# Patient Record
Sex: MALE | Race: OTHER | Employment: FULL TIME | URBAN - METROPOLITAN AREA
[De-identification: names, ages, dates, MRNs, and addresses within clinical notes are randomized per-mention and may not be internally consistent; named-entity substitution may affect disease eponyms.]

---

## 2024-03-26 ENCOUNTER — HOSPITAL ENCOUNTER (OUTPATIENT)
Facility: HOSPITAL | Age: 34
Setting detail: OBSERVATION
Discharge: HOME | End: 2024-03-27
Attending: EMERGENCY MEDICINE | Admitting: INTERNAL MEDICINE
Payer: COMMERCIAL

## 2024-03-26 ENCOUNTER — APPOINTMENT (EMERGENCY)
Dept: RADIOLOGY | Facility: HOSPITAL | Age: 34
End: 2024-03-26
Payer: COMMERCIAL

## 2024-03-26 DIAGNOSIS — M54.2 ANTERIOR NECK PAIN: ICD-10-CM

## 2024-03-26 DIAGNOSIS — S12.8XXA: Primary | ICD-10-CM

## 2024-03-26 DIAGNOSIS — R13.10 DYSPHAGIA: ICD-10-CM

## 2024-03-26 LAB
ANION GAP SERPL CALCULATED.3IONS-SCNC: 9 MMOL/L (ref 4–13)
BASOPHILS # BLD AUTO: 0.03 THOUSANDS/ÂΜL (ref 0–0.1)
BASOPHILS NFR BLD AUTO: 0 % (ref 0–1)
BUN SERPL-MCNC: 14 MG/DL (ref 5–25)
CALCIUM SERPL-MCNC: 9 MG/DL (ref 8.4–10.2)
CHLORIDE SERPL-SCNC: 104 MMOL/L (ref 96–108)
CO2 SERPL-SCNC: 26 MMOL/L (ref 21–32)
CREAT SERPL-MCNC: 0.94 MG/DL (ref 0.6–1.3)
EOSINOPHIL # BLD AUTO: 0.19 THOUSAND/ÂΜL (ref 0–0.61)
EOSINOPHIL NFR BLD AUTO: 2 % (ref 0–6)
ERYTHROCYTE [DISTWIDTH] IN BLOOD BY AUTOMATED COUNT: 12.9 % (ref 11.6–15.1)
GFR SERPL CREATININE-BSD FRML MDRD: 107 ML/MIN/1.73SQ M
GLUCOSE SERPL-MCNC: 84 MG/DL (ref 65–140)
HCT VFR BLD AUTO: 47.7 % (ref 36.5–49.3)
HGB BLD-MCNC: 15.7 G/DL (ref 12–17)
IMM GRANULOCYTES # BLD AUTO: 0.02 THOUSAND/UL (ref 0–0.2)
IMM GRANULOCYTES NFR BLD AUTO: 0 % (ref 0–2)
LYMPHOCYTES # BLD AUTO: 2.27 THOUSANDS/ÂΜL (ref 0.6–4.47)
LYMPHOCYTES NFR BLD AUTO: 25 % (ref 14–44)
MCH RBC QN AUTO: 30.7 PG (ref 26.8–34.3)
MCHC RBC AUTO-ENTMCNC: 32.9 G/DL (ref 31.4–37.4)
MCV RBC AUTO: 93 FL (ref 82–98)
MONOCYTES # BLD AUTO: 0.75 THOUSAND/ÂΜL (ref 0.17–1.22)
MONOCYTES NFR BLD AUTO: 8 % (ref 4–12)
NEUTROPHILS # BLD AUTO: 5.9 THOUSANDS/ÂΜL (ref 1.85–7.62)
NEUTS SEG NFR BLD AUTO: 65 % (ref 43–75)
NRBC BLD AUTO-RTO: 0 /100 WBCS
PLATELET # BLD AUTO: 210 THOUSANDS/UL (ref 149–390)
PMV BLD AUTO: 10.7 FL (ref 8.9–12.7)
POTASSIUM SERPL-SCNC: 3.7 MMOL/L (ref 3.5–5.3)
PROCALCITONIN SERPL-MCNC: <0.05 NG/ML
RBC # BLD AUTO: 5.12 MILLION/UL (ref 3.88–5.62)
SODIUM SERPL-SCNC: 139 MMOL/L (ref 135–147)
TSH SERPL DL<=0.05 MIU/L-ACNC: 0.27 UIU/ML (ref 0.45–4.5)
WBC # BLD AUTO: 9.16 THOUSAND/UL (ref 4.31–10.16)

## 2024-03-26 PROCEDURE — 31575 DIAGNOSTIC LARYNGOSCOPY: CPT | Performed by: OTOLARYNGOLOGY

## 2024-03-26 PROCEDURE — 85025 COMPLETE CBC W/AUTO DIFF WBC: CPT

## 2024-03-26 PROCEDURE — 96374 THER/PROPH/DIAG INJ IV PUSH: CPT

## 2024-03-26 PROCEDURE — 92610 EVALUATE SWALLOWING FUNCTION: CPT

## 2024-03-26 PROCEDURE — 84443 ASSAY THYROID STIM HORMONE: CPT

## 2024-03-26 PROCEDURE — 84145 PROCALCITONIN (PCT): CPT

## 2024-03-26 PROCEDURE — 99285 EMERGENCY DEPT VISIT HI MDM: CPT | Performed by: EMERGENCY MEDICINE

## 2024-03-26 PROCEDURE — 80048 BASIC METABOLIC PNL TOTAL CA: CPT

## 2024-03-26 PROCEDURE — 99284 EMERGENCY DEPT VISIT MOD MDM: CPT

## 2024-03-26 PROCEDURE — 36415 COLL VENOUS BLD VENIPUNCTURE: CPT

## 2024-03-26 PROCEDURE — 99221 1ST HOSP IP/OBS SF/LOW 40: CPT | Performed by: OTOLARYNGOLOGY

## 2024-03-26 PROCEDURE — 70491 CT SOFT TISSUE NECK W/DYE: CPT

## 2024-03-26 RX ORDER — PANTOPRAZOLE SODIUM 40 MG/1
40 TABLET, DELAYED RELEASE ORAL
Status: DISCONTINUED | OUTPATIENT
Start: 2024-03-26 | End: 2024-03-27 | Stop reason: HOSPADM

## 2024-03-26 RX ORDER — OXYCODONE HCL 5 MG/5 ML
5 SOLUTION, ORAL ORAL ONCE
Status: COMPLETED | OUTPATIENT
Start: 2024-03-26 | End: 2024-03-26

## 2024-03-26 RX ORDER — SODIUM CHLORIDE 9 MG/ML
100 INJECTION, SOLUTION INTRAVENOUS CONTINUOUS
Status: DISCONTINUED | OUTPATIENT
Start: 2024-03-27 | End: 2024-03-27 | Stop reason: HOSPADM

## 2024-03-26 RX ORDER — FAMOTIDINE 20 MG/1
40 TABLET, FILM COATED ORAL EVERY MORNING
Status: DISCONTINUED | OUTPATIENT
Start: 2024-03-26 | End: 2024-03-27 | Stop reason: HOSPADM

## 2024-03-26 RX ORDER — OXYCODONE HCL 5 MG/5 ML
10 SOLUTION, ORAL ORAL ONCE
Status: COMPLETED | OUTPATIENT
Start: 2024-03-26 | End: 2024-03-26

## 2024-03-26 RX ORDER — DEXAMETHASONE SODIUM PHOSPHATE 10 MG/ML
10 INJECTION, SOLUTION INTRAMUSCULAR; INTRAVENOUS EVERY 6 HOURS SCHEDULED
Status: DISCONTINUED | OUTPATIENT
Start: 2024-03-26 | End: 2024-03-27 | Stop reason: HOSPADM

## 2024-03-26 RX ORDER — MORPHINE SULFATE 4 MG/ML
4 INJECTION, SOLUTION INTRAMUSCULAR; INTRAVENOUS ONCE
Status: COMPLETED | OUTPATIENT
Start: 2024-03-26 | End: 2024-03-26

## 2024-03-26 RX ADMIN — DEXAMETHASONE SODIUM PHOSPHATE 10 MG: 10 INJECTION, SOLUTION INTRAMUSCULAR; INTRAVENOUS at 23:45

## 2024-03-26 RX ADMIN — MORPHINE SULFATE 4 MG: 4 INJECTION INTRAVENOUS at 05:57

## 2024-03-26 RX ADMIN — DEXAMETHASONE SODIUM PHOSPHATE 15 MG: 10 INJECTION, SOLUTION INTRAMUSCULAR; INTRAVENOUS at 02:38

## 2024-03-26 RX ADMIN — SODIUM CHLORIDE 100 ML/HR: 0.9 INJECTION, SOLUTION INTRAVENOUS at 23:45

## 2024-03-26 RX ADMIN — OXYCODONE HYDROCHLORIDE 10 MG: 5 SOLUTION ORAL at 03:48

## 2024-03-26 RX ADMIN — DEXAMETHASONE SODIUM PHOSPHATE 10 MG: 10 INJECTION, SOLUTION INTRAMUSCULAR; INTRAVENOUS at 11:30

## 2024-03-26 RX ADMIN — OXYCODONE HYDROCHLORIDE 5 MG: 5 SOLUTION ORAL at 03:00

## 2024-03-26 RX ADMIN — IOHEXOL 85 ML: 350 INJECTION, SOLUTION INTRAVENOUS at 04:11

## 2024-03-26 RX ADMIN — DEXAMETHASONE SODIUM PHOSPHATE 10 MG: 10 INJECTION, SOLUTION INTRAMUSCULAR; INTRAVENOUS at 17:19

## 2024-03-26 NOTE — ED PROVIDER NOTES
History  Chief Complaint   Patient presents with    Neck Pain     Presents to ER with pain in the front of the neck, hurts upon palpation. Unable to eat/ drink, difficulty talking and swallowing. Voice hoarse sounding      33-year-old male with no pertinent past medical history presents with neck pain and dysphagia for 3 days.  Symptoms are gradual in onset and worsening.  Patient also reports dysphonia.  Able to tolerate his secretions.  No history of similar symptoms.        None       No past medical history on file.    No past surgical history on file.    No family history on file.  I have reviewed and agree with the history as documented.    E-Cigarette/Vaping     E-Cigarette/Vaping Substances     Social History     Tobacco Use    Smoking status: Never    Smokeless tobacco: Never   Substance Use Topics    Alcohol use: Never    Drug use: Never        Review of Systems   Constitutional:  Negative for chills and fever.   HENT:  Positive for sore throat. Negative for ear pain.    Eyes:  Negative for pain and visual disturbance.   Respiratory:  Negative for cough and shortness of breath.    Cardiovascular:  Negative for chest pain and palpitations.   Gastrointestinal:  Negative for abdominal pain and vomiting.   Genitourinary:  Negative for dysuria and hematuria.   Musculoskeletal:  Negative for arthralgias and back pain.   Skin:  Negative for color change and rash.   Neurological:  Negative for seizures and syncope.   All other systems reviewed and are negative.      Physical Exam  ED Triage Vitals   Temperature Pulse Respirations Blood Pressure SpO2   03/26/24 0130 03/26/24 0130 03/26/24 0130 03/26/24 0130 03/26/24 0130   98.4 °F (36.9 °C) 89 18 154/99 97 %      Temp Source Heart Rate Source Patient Position - Orthostatic VS BP Location FiO2 (%)   03/26/24 0130 03/26/24 0130 03/26/24 0130 03/26/24 0130 --   Tympanic Monitor Sitting Left arm       Pain Score       03/26/24 0300       10 - Worst Possible Pain              Orthostatic Vital Signs  Vitals:    03/26/24 0330 03/26/24 0600 03/26/24 1033 03/26/24 1410   BP: 138/86 141/86  143/92   Pulse: 75 90 83 89   Patient Position - Orthostatic VS: Lying          Physical Exam  Vitals and nursing note reviewed.   Constitutional:       General: He is in acute distress.      Appearance: Normal appearance. He is well-developed and normal weight. He is not toxic-appearing or diaphoretic.   HENT:      Head: Normocephalic and atraumatic.      Right Ear: External ear normal.      Left Ear: External ear normal.      Nose: Nose normal.      Mouth/Throat:      Mouth: Mucous membranes are moist.      Pharynx: Posterior oropharyngeal erythema present. No oropharyngeal exudate.      Comments: Erythema in the posterior pharynx.  No oropharyngeal edema.  Eyes:      Conjunctiva/sclera: Conjunctivae normal.   Neck:      Comments: Tender indurated mass right anterior neck between the thyroid and SCM  Cardiovascular:      Rate and Rhythm: Normal rate.   Pulmonary:      Effort: Pulmonary effort is normal. No respiratory distress.   Musculoskeletal:         General: No swelling.      Cervical back: Normal range of motion and neck supple. Tenderness present. No rigidity.   Skin:     General: Skin is warm and dry.      Capillary Refill: Capillary refill takes less than 2 seconds.   Neurological:      Mental Status: He is alert and oriented to person, place, and time.   Psychiatric:         Mood and Affect: Mood normal.         Behavior: Behavior normal.         ED Medications  Medications   dexamethasone (PF) (DECADRON) injection 10 mg (10 mg Intravenous Given 3/26/24 1719)   pantoprazole (PROTONIX) EC tablet 40 mg (40 mg Oral Not Given 3/26/24 1000)   famotidine (PEPCID) tablet 40 mg (40 mg Oral Not Given 3/26/24 1052)   sodium chloride 0.9 % infusion (has no administration in time range)   dexamethasone oral liquid 15 mg 1.5 mL (15 mg Oral Given 3/26/24 0238)   oxyCODONE (ROXICODONE) oral solution  5 mg (5 mg Oral Given 3/26/24 0300)   oxyCODONE (ROXICODONE) oral solution 10 mg (10 mg Oral Given 3/26/24 0348)   iohexol (OMNIPAQUE) 350 MG/ML injection (MULTI-DOSE) 85 mL (85 mL Intravenous Given 3/26/24 8711)   morphine injection 4 mg (4 mg Intravenous Given 3/26/24 0557)       Diagnostic Studies  Results Reviewed       Procedure Component Value Units Date/Time    TSH, 3rd generation [[id number]]  (Abnormal) Collected: 03/26/24 1406    Lab Status: Final result Specimen: Blood from Arm, Left Updated: 03/26/24 1447     TSH 3RD GENERATON 0.265 uIU/mL     Procalcitonin [[id number]]  (Normal) Collected: 03/26/24 0600    Lab Status: Final result Specimen: Blood from Arm, Left Updated: 03/26/24 0636     Procalcitonin <0.05 ng/ml     Basic metabolic panel [[id number]] Collected: 03/26/24 0600    Lab Status: Final result Specimen: Blood from Arm, Left Updated: 03/26/24 0628     Sodium 139 mmol/L      Potassium 3.7 mmol/L      Chloride 104 mmol/L      CO2 26 mmol/L      ANION GAP 9 mmol/L      BUN 14 mg/dL      Creatinine 0.94 mg/dL      Glucose 84 mg/dL      Calcium 9.0 mg/dL      eGFR 107 ml/min/1.73sq m     Narrative:      National Kid[employer] Disease Foundation guidelines for Chronic Kidney Disease (CKD):     Stage 1 with normal or high GFR (GFR > 90 mL/min/1.73 square meters)    Stage 2 Mild CKD (GFR = 60-89 mL/min/1.73 square meters)    Stage 3A Moderate CKD (GFR = 45-59 mL/min/1.73 square meters)    Stage 3B Moderate CKD (GFR = 30-44 mL/min/1.73 square meters)    Stage 4 Severe CKD (GFR = 15-29 mL/min/1.73 square meters)    Stage 5 End Stage CKD (GFR <15 mL/min/1.73 square meters)  Note: GFR calculation is accurate only with a steady state creatinine    CBC and differential [[id number]] Collected: 03/26/24 0600    Lab Status: Final result Specimen: Blood from Arm, Left Updated: 03/26/24 0606     WBC 9.16 Thousand/uL      RBC 5.12 Million/uL      Hemoglobin 15.7 g/dL      Hematocrit 47.7 %      MCV 93 fL      MCH 30.7 pg       MCHC 32.9 g/dL      RDW 12.9 %      MPV 10.7 fL      Platelets 210 Thousands/uL      nRBC 0 /100 WBCs      Neutrophils Relative 65 %      Immature Grans % 0 %      Lymphocytes Relative 25 %      Monocytes Relative 8 %      Eosinophils Relative 2 %      Basophils Relative 0 %      Neutrophils Absolute 5.90 Thousands/µL      Absolute Immature Grans 0.02 Thousand/uL      Absolute Lymphocytes 2.27 Thousands/µL      Absolute Monocytes 0.75 Thousand/µL      Eosinophils Absolute 0.19 Thousand/µL      Basophils Absolute 0.03 Thousands/µL                    CT soft tissue neck with contrast   Final Result by Srinivasa Rodriguez MD (03/26 0531)      Abnormal findings of the thyroid cartilage suspicious for acute to subacute right anterior paramedian nondisplaced fracture and old healed left anterior fracture.      Trace posttraumatic or inflammatory fluid immediately overlying the anterior thyroid cartilage extending inferiorly and posterolaterally on the right side with minimal thickening of the right vocal cord. Cervical airway is patent.      No other evidence of acute process in the neck.      I personally discussed this study with BAILEY BOYKIN on 3/26/2024 at 05:26            Workstation performed: [id number]               Procedures  Procedures      ED Course                             SBIRT 20yo+      Flowsheet Row Most Recent Value   Initial Alcohol Screen: US AUDIT-C     1. How often do you have a drink containing alcohol? 0 Filed at: 03/26/2024 0133   2. How many drinks containing alcohol do you have on a typical day you are drinking?  0 Filed at: 03/26/2024 0133   3a. Male UNDER 65: How often do you have five or more drinks on one occasion? 0 Filed at: 03/26/2024 0133   Audit-C Score 0 Filed at: 03/26/2024 0133   SRUTHI: How many times in the past year have you...    Used an illegal drug or used a prescription medication for non-medical reasons? Never Filed at: 03/26/2024 0133                  Medical  Decision Making  33-year-old male with presentation concerning for deep neck space infection.  Will order CT soft tissue neck to assess for peritonsillar abscess, retropharyngeal abscess, other deep neck space infections.    Workup significant for acute/subacute laryngeal injury.  ENT consulted.    Amount and/or Complexity of Data Reviewed  Labs: ordered.  Radiology: ordered.    Risk  Prescription drug management.  Decision regarding hospitalization.          Disposition  Final diagnoses:   Anterior neck pain   Dysphagia   Fracture of laryngeal cartilage, initial encounter (HCC)     Time reflects when diagnosis was documented in both MDM as applicable and the Disposition within this note       Time User Action Codes Description Comment    3/26/2024  6:47 AM Rich Lainez [M54.2] Anterior neck pain     3/26/2024  6:47 AM Rich Lainez [R13.10] Dysphagia     3/26/2024  7:21 AM Rich Lainez [S12.8XXA] Fracture of laryngeal cartilage, initial encounter (HCC)     3/26/2024  9:43 AM Shahab Porter [M54.2] Anterior neck pain     3/26/2024  9:43 AM Shahab Porter [S12.8XXA] Fracture of laryngeal cartilage, initial encounter (HCC)           ED Disposition       ED Disposition   Admit    Condition   Stable    Date/Time   Tue Mar 26, 2024 1020    Comment   Case was discussed with sod and the patient's admission status was agreed to be Admission Status: observation status to the service of Dr. Larkin .               Follow-up Information       Follow up With Specialties Details Why Contact Info Additional Information    [facility]   [facility]  [address]  [facility] [id number] [phone] [facility], [facility] [address] [address]            There are no discharge medications for this patient.    No discharge procedures on file.    PDMP Review       None             ED Provider  Attending physically  available and evaluated Smith Henning. I managed the patient along with the ED Attending.    Electronically Signed by           Rich Lainez MD  03/26/24 2055

## 2024-03-26 NOTE — ED ATTENDING ATTESTATION
3/26/2024  IHector MD, saw and evaluated the patient. I have discussed the patient with the resident/non-physician practitioner and agree with the resident's/non-physician practitioner's findings, Plan of Care, and MDM as documented in the resident's/non-physician practitioner's note, except where noted. All available labs and Radiology studies were reviewed.  I was present for key portions of any procedure(s) performed by the resident/non-physician practitioner and I was immediately available to provide assistance.       At this point I agree with the current assessment done in the Emergency Department.  I have conducted an independent evaluation of this patient a history and physical is as follows:    ED Course  ED Course as of 03/26/24 0510   Tue Mar 26, 2024   0222 Per resident h&p 32 YO M presents for sore throat neck mass I/P CT ST neck; corticosteroids     Emergency Department Note- Smith Henning 33 y.o. male MRN: [id number]    Unit/Bed#: ED 24 Encounter: [id number]    Smith Henning is a 33 y.o. male who presents with   Chief Complaint   Patient presents with    Neck Pain     Presents to ER with pain in the front of the neck, hurts upon palpation. Unable to eat/ drink, difficulty talking and swallowing. Voice hoarse sounding          History of Present Illness   HPI:  Smith Henning is a 33 y.o. male who presents for evaluation of:  Sore throat and right sided anterior neck discomfort.  Patient denies any trauma to the area.  Patient's voice sounds hoarse to him.  He denies associated fevers and chills.  He denies difficulty swallowing.  He denies associated dyspnea and cough.  He denies any posterior neck discomfort.  The discomfort is aching and of moderate intensity.  Movement provokes the discomfort; rest somewhat palliates the discomfort.    Review of Systems   Constitutional:  Negative for fatigue and fever.   HENT:  Positive for sore throat and voice change. Negative for  congestion and trouble swallowing.    Respiratory:  Negative for cough and shortness of breath.    Cardiovascular:  Negative for chest pain and palpitations.   Gastrointestinal:  Negative for abdominal pain and nausea.   Genitourinary:  Negative for flank pain and frequency.   Neurological:  Negative for light-headedness and headaches.   Psychiatric/Behavioral:  Negative for dysphoric mood and hallucinations.    All other systems reviewed and are negative.      Historical Information   No past medical history on file.  No past surgical history on file.  Social History   Social History     Substance and Sexual Activity   Alcohol Use Never     Social History     Substance and Sexual Activity   Drug Use Never     Social History     Tobacco Use   Smoking Status Never   Smokeless Tobacco Never     Family History: No family history on file.    Meds/Allergies   PTA meds:   None     No Known Allergies    Objective   First Vitals:   Blood Pressure: 154/99 (03/26/24 0130)  Pulse: 89 (03/26/24 0130)  Temperature: 98.4 °F (36.9 °C) (03/26/24 0130)  Temp Source: Tympanic (03/26/24 0130)  Respirations: 18 (03/26/24 0130)  SpO2: 97 % (03/26/24 0130)    Current Vitals:   Blood Pressure: 138/86 (03/26/24 0330)  Pulse: 75 (03/26/24 0330)  Temperature: 98.4 °F (36.9 °C) (03/26/24 0130)  Temp Source: Tympanic (03/26/24 0130)  Respirations: 18 (03/26/24 0330)  SpO2: 99 % (03/26/24 0330)    No intake or output data in the 24 hours ending 03/26/24 0510    Invasive Devices       Peripheral Intravenous Line  Duration             Peripheral IV 03/26/24 Left;Ventral (anterior) Forearm <1 day                    Physical Exam  Vitals and nursing note reviewed.   Constitutional:       General: He is not in acute distress.     Appearance: Normal appearance. He is well-developed.   HENT:      Head: Normocephalic and atraumatic.      Jaw: No trismus, tenderness or swelling.      Right Ear: External ear normal.      Left Ear: External ear normal.     "  Nose: Nose normal.      Mouth/Throat:      Dentition: Normal dentition. No dental tenderness or dental caries.      Palate: No mass.      Pharynx: No pharyngeal swelling, oropharyngeal exudate, posterior oropharyngeal erythema or uvula swelling.      Tonsils: No tonsillar exudate or tonsillar abscesses.   Eyes:      Conjunctiva/sclera: Conjunctivae normal.      Pupils: Pupils are equal, round, and reactive to light.   Cardiovascular:      Rate and Rhythm: Normal rate and regular rhythm.   Pulmonary:      Effort: Pulmonary effort is normal. No respiratory distress.   Abdominal:      General: Abdomen is flat. There is no distension.      Palpations: Abdomen is soft.   Musculoskeletal:         General: No deformity. Normal range of motion.      Cervical back: Normal range of motion and neck supple.   Skin:     General: Skin is warm and dry.      Capillary Refill: Capillary refill takes less than 2 seconds.   Neurological:      General: No focal deficit present.      Mental Status: He is alert and oriented to person, place, and time. Mental status is at baseline.      Coordination: Coordination normal.   Psychiatric:         Mood and Affect: Mood normal.         Behavior: Behavior normal.         Thought Content: Thought content normal.         Judgment: Judgment normal.           Medical Decision Makin.  Acute throat discomfort: CT scan soft tissue neck with contrast rule out neck abscess.    No results found for this or any previous visit (from the past 36 hour(s)).  CT soft tissue neck with contrast    (Results Pending)         Portions of the record may have been created with voice recognition software. Occasional wrong word or \"sound a like\" substitutions may have occurred due to the inherent limitations of voice recognition software.  Read the chart carefully and recognize, using context, where substitutions have occurred.        Critical Care Time  Procedures      "

## 2024-03-26 NOTE — CONSULTS
Consultation - [facility]/ENT   Smith Henning 33 y.o. male MRN: [id number]  Unit/Bed#: ED 24 Encounter: [id number]        Assessment:  33 year-old man with acute to subacute fracture of his thyroid cartilage without any respiratory distress or airway obstruction on flexible fiberoptic laryngoscopy.    Plan:  Admission to medicine for observation.  Patient can have diet today. He should have some fluids for his dehydration.  Patient will need to be made NPO at midnight tonight.  Repeat bedside laryngoscopy tomorrow morning.  Decadron 10 mg Q 6.  Pepcid 40 mgs QHS.  Omeprazole 40 mgs QAM.  Voice rest for 1 - 2 weeks.  If patient's clinical status remains stable or improves, anticipated discharge from ENT perspective would be 1 - 2 days.    History of Present Illness   Physician Requesting Consult: Adalberto Bright DO  Reason for Consult / Principal Problem: laryngeal cartilage fracture  HPI: Smith Henning is a 33 y.o. year old male who presents with throat pain that started about 1 month ago after a hard coughing episode. He noticed voice changes afterwards as well. No breathing difficulties currently. He denies any trauma. A CT of the neck was acquired in the [place] ED today.    Review of systems:  10 Point ROS was performed and negative except as above or otherwise noted in the medical record.    Historical Information   No past medical history on file.  No past surgical history on file.  Social History   Social History     Substance and Sexual Activity   Alcohol Use Never     Social History     Substance and Sexual Activity   Drug Use Never     Social History     Tobacco Use   Smoking Status Never   Smokeless Tobacco Never     Family History: non-contributory    Meds/Allergies   all current active meds have been reviewed    No Known Allergies    Objective     Vitals:    03/26/24 0600   BP: 141/86   Pulse: 90   Resp: 18   Temp:    SpO2: 97%         Physical Exam   Constitutional: Oriented to person,  place, and time. Well-developed and well-nourished, no apparent distress, non-toxic appearance. Cooperative, able to hear and answer questions without difficulty.    Voice: Mild raspiness, soft.  Head: Normocephalic, atraumatic.  No scars, masses or lesions.  Face: Symmetric, no edema, no sinus tenderness.  Eyes: Vision grossly intact, extra-ocular movement intact.  Ears: External ears normal.  No post-auricular erythema or tenderness.  Nose: Mild right septal caudal deviation, dry nasal mucosa.  Oral cavity: Dentition intact.  Mucosa moist, lips without lesions or masses.  Tongue mobile, floor of mouth soft and flat.  Hard palate intact.  No masses or lesions.   Oropharynx: Uvula is midline, soft palate intact without lesion or mass.  Oropharyngeal inlet without obstruction.  Tonsils unremarkable.  Posterior pharyngeal wall clear. No masses or lesions.  Salivary glands:  Parotid glands and submandibular glands symmetric, no enlargement or tenderness.  Neck: Tenderness of right anterior neck. Mild swelling of right anterior neck from larynx to suprasternal notch.  Thyroid: Without tenderness or palpable nodules.  Pulmonary/Chest: Normal effort and rate. No respiratory distress. No stertor or stridor  Musculoskeletal: Normal range of motion.   Neurological: Cranial nerves 2-12 intact.  Skin: Skin is warm and dry.   Psychiatric: Normal mood and affect.    Procedure: Fiberoptic nasopharyngolaryngoscopy  Diagnosis: laryngeal fracture    The risks, benefits and alternatives were discussed. The patient voiced their understanding. They wished to proceed and an informed consent was obtained. The patient's nose was topically decongested and anesthetized using Lidocaine and oxymetazoline. To obtain a view not available with a laryngeal mirror, a fiberoptic endoscope was inserted via the left nasal cavity.      Findings listed below:    -Normal appearing nasal cavity, nasopharynx, fossa of Rosenmüller, oropharynx, BOT,  epiglottis.  - Both vocal folds were mobile however there was decreased abduction bilaterally.  - Mild edema of the right vocal fold and false fold.  - Patent airway  - No Subglottic edema  - Mild arytenoid erythema  - Mild diffuse edema without vocal fold edema  - No posterior commissure hypertrophy  - No granulation  - No thick endolaryngeal mucus    No intake or output data in the 24 hours ending 03/26/24 0924    Invasive Devices       Peripheral Intravenous Line  Duration             Peripheral IV 03/26/24 Left;Ventral (anterior) Forearm <1 day                    Lab Results: I have personally reviewed pertinent lab results.    Imaging Studies: I have personally reviewed pertinent reports.    CT neck reviewed.  EKG, Pathology, and Other Studies: I have personally reviewed pertinent reports.      Code Status: No Order  Advance Directive and Living Will:      Power of :    POLST:      Sergio Evans MD PGY-3  [facility] Otolaryngology - Head and Neck Surgery  Available on [employer] Text.  Please contact ENT Resident [employer] Text Role for any questions or concerns.

## 2024-03-26 NOTE — H&P
INTERNAL MEDICINE RESIDENCY ADMISSION H&P     Name: Smith Henning   Age & Sex: 33 y.o. male   MRN: [id number]  Unit/Bed#: ED 24   Encounter: [id number]  Primary Care Provider: No primary care provider on file.    Code Status: Level 1 - Full Code  Admission Status: OBSERVATION  Disposition: Patient requires Med/Surg    Admit to team: SOD Team A    ASSESSMENT/PLAN     Active Problems:    Atraumatic, closed fracture of thyroid cartilage of Unknown Etiology      No new Assessment & Plan notes have been filed under this hospital service since the last note was generated.  Service: Internal Medicine      VTE Pharmacologic Prophylaxis: Reason for no pharmacologic prophylaxis not at high risk for VTE  VTE Mechanical Prophylaxis: reason for no mechanical VTE prophylaxis not at high risk for VTE    CHIEF COMPLAINT     Chief Complaint   Patient presents with    Neck Pain     Presents to ER with pain in the front of the neck, hurts upon palpation. Unable to eat/ drink, difficulty talking and swallowing. Voice hoarse sounding       HISTORY OF PRESENT ILLNESS     Patient is a 32 yo M with self reported history of GERD presenting with 3 days of worsening anterior throat pain, odynophagia, and dysphonia. He denies any trauma to the area, but notes a coughing episode about a month ago that may be related to this injury. He denies any history of bones fracturing easily. His reflux is controlled by diet and is currently not on medication.     CT soft tissue neck with contrast showed a patent airway with an acute to subacute right anterior paramedian nondisplaced fracture of the thyroid cartilage and old healed left anterior fracture.     ENT was consulted and performed a fiberoptic nasopharyngolaryngoscopy It was notable for decreased abduction of vocal cords bilaterally, mild edema of the R vocal fold and false fold, mild arytenoid erythema, mild diffuse edema without vocal fold edema. Normal appearing nasal cavity,  nasopharynx, fossa of Rosenmüller, oropharynx, BOT, epiglottis and negative for granulation, endolaryngeal mucus, and posterior commissure hypertrophy.       REVIEW OF SYSTEMS     Review of Systems   Constitutional:  Negative for chills and fever.   HENT:  Positive for trouble swallowing and voice change. Negative for dental problem, drooling and mouth sores.    Respiratory:  Negative for shortness of breath, wheezing and stridor.    Gastrointestinal:         History of reflux     OBJECTIVE     Vitals:    24 0130 24 0330 24 0600 24 1033   BP: 154/99 138/86 141/86    BP Location: Left arm Right arm     Pulse: 89 75 90 83   Resp: 18 18 18 16   Temp: 98.4 °F (36.9 °C)      TempSrc: Tympanic      SpO2: 97% 99% 97% 97%      Temperature:   Temp (24hrs), Av.4 °F (36.9 °C), Min:98.4 °F (36.9 °C), Max:98.4 °F (36.9 °C)    Temperature: 98.4 °F (36.9 °C)  Intake & Output:  I/O       None          Weights:        There is no height or weight on file to calculate BMI.  Weight (last 2 days)       None          Physical Exam  Constitutional:       General: He is not in acute distress.     Appearance: Normal appearance.   HENT:      Head: Normocephalic.      Right Ear: External ear normal.      Left Ear: External ear normal.      Mouth/Throat:      Mouth: Mucous membranes are moist.      Pharynx: Oropharynx is clear. No oropharyngeal exudate or posterior oropharyngeal erythema.      Comments: Mildly hoarse voice  Eyes:      Extraocular Movements: Extraocular movements intact.      Conjunctiva/sclera: Conjunctivae normal.      Pupils: Pupils are equal, round, and reactive to light.   Cardiovascular:      Rate and Rhythm: Normal rate and regular rhythm.      Heart sounds: Normal heart sounds.   Pulmonary:      Effort: Pulmonary effort is normal. No respiratory distress.      Breath sounds: Normal breath sounds. No wheezing, rhonchi or rales.   Abdominal:      General: Abdomen is flat.      Palpations:  "Abdomen is soft.   Musculoskeletal:         General: Normal range of motion.      Cervical back: No rigidity.   Skin:     General: Skin is warm and dry.   Neurological:      General: No focal deficit present.      Mental Status: He is alert and oriented to person, place, and time.   Psychiatric:         Mood and Affect: Mood normal.         Behavior: Behavior normal.       PAST MEDICAL HISTORY   No past medical history on file.  PAST SURGICAL HISTORY   No past surgical history on file.  SOCIAL & FAMILY HISTORY     Social History     Substance and Sexual Activity   Alcohol Use Never       Social History     Substance and Sexual Activity   Drug Use Never     Social History     Tobacco Use   Smoking Status Never   Smokeless Tobacco Never     No family history on file.  LABORATORY DATA     Labs: I have personally reviewed pertinent reports.    Results from last 7 days   Lab Units 03/26/24  0600   WBC Thousand/uL 9.16   HEMOGLOBIN g/dL 15.7   HEMATOCRIT % 47.7   PLATELETS Thousands/uL 210   NEUTROS PCT % 65   MONOS PCT % 8   EOS PCT % 2      Results from last 7 days   Lab Units 03/26/24  0600   POTASSIUM mmol/L 3.7   CHLORIDE mmol/L 104   CO2 mmol/L 26   BUN mg/dL 14   CREATININE mg/dL 0.94   CALCIUM mg/dL 9.0                          Micro:  No results found for: \"BLOODCX\", \"URINECX\", \"WOUNDCULT\", \"SPUTUMCULTUR\"  IMAGING & DIAGNOSTIC TESTS     Imaging: I have personally reviewed pertinent reports.    CT soft tissue neck with contrast    Result Date: 3/26/2024  Impression: Abnormal findings of the thyroid cartilage suspicious for acute to subacute right anterior paramedian nondisplaced fracture and old healed left anterior fracture. Trace posttraumatic or inflammatory fluid immediately overlying the anterior thyroid cartilage extending inferiorly and posterolaterally on the right side with minimal thickening of the right vocal cord. Cervical airway is patent. No other evidence of acute process in the neck. I personally " discussed this study with BAILEY BOYKIN on 3/26/2024 at 05:26 Workstation performed: [id number]     EKG, Pathology, and Other Studies: I have personally reviewed pertinent reports.     ALLERGIES   No Known Allergies  MEDICATIONS PRIOR TO ARRIVAL     Prior to Admission medications    Not on File     MEDICATIONS ADMINISTERED IN LAST 24 HOURS     Medication Administration - last 24 hours from 03/25/2024 1125 to 03/26/2024 1125         Date/Time Order Dose Route Action Action by     03/26/2024 0238 EDT dexamethasone oral liquid 15 mg 1.5 mL 15 mg Oral Given Lisbeth Morris RN     03/26/2024 0300 EDT oxyCODONE (ROXICODONE) oral solution 5 mg 5 mg Oral Given Lisbeth Morris RN     03/26/2024 0348 EDT oxyCODONE (ROXICODONE) oral solution 10 mg 10 mg Oral Given Lisbeth Morris RN     03/26/2024 0411 EDT iohexol (OMNIPAQUE) 350 MG/ML injection (MULTI-DOSE) 85 mL 85 mL Intravenous Given Nicolasa Ferraira     03/26/2024 0557 EDT morphine injection 4 mg 4 mg Intravenous Given Lisbeth Morris RN     03/26/2024 1000 EDT pantoprazole (PROTONIX) EC tablet 40 mg 40 mg Oral Not Given Leatha Pimentel RN     03/26/2024 1052 EDT famotidine (PEPCID) tablet 40 mg 40 mg Oral Not Given Leatha Pimentel RN          CURRENT MEDICATIONS     Current Facility-Administered Medications   Medication Dose Route Frequency Provider Last Rate    dexamethasone  10 mg Intravenous Q6H [facility] Rizwan Michael MD      famotidine  40 mg Oral QAM Rizwan Michael MD      pantoprazole  40 mg Oral Early Morning Rizwan Michael MD      [START ON 3/27/2024] sodium chloride  100 mL/hr Intravenous Continuous Rizwan Michael MD       [START ON 3/27/2024] sodium chloride, 100 mL/hr           Admission Time  I spent 30 minutes admitting the patient.  This involved direct patient contact where I performed a full history and physical, reviewing previous records, and reviewing laboratory and other diagnostic studies.    Portions  "of the record may have been created with voice recognition software.  Occasional wrong word or \"sound a like\" substitutions may have occurred due to the inherent limitations of voice recognition software.  Read the chart carefully and recognize, using context, where substitutions have occurred.    ==    Ras Michael MD  [facility]  Internal Medicine Residency PGY-2   "

## 2024-03-26 NOTE — Clinical Note
Smith Henning was seen and treated in our emergency department on 3/26/2024.                Diagnosis:     Smith  is off the rest of the shift today.    He may return on this date: 03/27/2024         If you have any questions or concerns, please don't hesitate to call.      Rich Lainez MD    ______________________________           _______________          _______________  Hospital Representative                              Date                                Time

## 2024-03-26 NOTE — ED CARE HANDOFF
Emergency Department Sign Out Note        Sign out and transfer of care from Dr. Rich Lainez. See Separate Emergency Department note.     The patient, Smith Henning, was evaluated by the previous provider for neck pain, dysphagia.    Workup Completed:  Cbc bmp ct neck     ED Course / Workup Pending (followup):  ENT consultation                                  ED Course as of 03/26/24 1509   Tue Mar 26, 2024   0701 SO: laryngeal fracture, ENT consult    0715 ENT coming fo reval, likely DC home   0948 Will be admitted to SOD service.  Pending [place] text from SOD resident.  Spoke with ENT attending.  Wants admission, steroid course, repeat laryngoscopy tomorrow morning     Procedures  Medical Decision Making  This was discussed with ENT attending.  Patient will be admitted to medicine service for further medical management.  Treatment is focused on reducing inflammation.  Plan for repeat laryngoscope be tomorrow.    Amount and/or Complexity of Data Reviewed  Labs: ordered.  Radiology: ordered.    Risk  Prescription drug management.  Decision regarding hospitalization.            Disposition  Final diagnoses:   Anterior neck pain   Dysphagia   Fracture of laryngeal cartilage, initial encounter (HCC)     Time reflects when diagnosis was documented in both MDM as applicable and the Disposition within this note       Time User Action Codes Description Comment    3/26/2024  6:47 AM Rich Lainez [M54.2] Anterior neck pain     3/26/2024  6:47 AM Rich Lainez [R13.10] Dysphagia     3/26/2024  7:21 AM Rich Lainez [S12.8XXA] Fracture of laryngeal cartilage, initial encounter (HCC)     3/26/2024  9:43 AM Shahab Porter Modify [M54.2] Anterior neck pain     3/26/2024  9:43 AM Shahab Porter Modify [S12.8XXA] Fracture of laryngeal cartilage, initial encounter (HCC)           ED Disposition       ED Disposition   Admit    Condition   Stable    Date/Time   Tue Mar 26, 2024 10:20 AM    Comment   Case was discussed  with sod and the patient's admission status was agreed to be Admission Status: observation status to the service of Dr. Larkin .               Follow-up Information       Follow up With Specialties Details Why Contact Info Additional Information    [facility]   [facility]  [address]  [facility] [id number] [phone] [facility], [facility] [address] [facility], [place] [address]          There are no discharge medications for this patient.    No discharge procedures on file.       ED Provider  Electronically Signed by     Shahab Porter DO  03/26/24 [id number]

## 2024-03-26 NOTE — LETTER
[facility]  [address] [address]  Dept: [phone]    March 27, 2024     Patient: Smith Henning   YOB: 1990   Date of Visit: 3/26/2024       To Whom it May Concern:    Smith Henning is under my professional care. He was seen in the hospital from 3/26/2024 to 03/27/24. He may return to work on 4/1/2024.    If you have any questions or concerns, please don't hesitate to call.         Sincerely,          Albin Weiss MD

## 2024-03-26 NOTE — Clinical Note
Case was discussed with sod and the patient's admission status was agreed to be Admission Status: observation status to the service of Dr. MEEK .

## 2024-03-26 NOTE — SPEECH THERAPY NOTE
Speech-Language Pathology Bedside Swallow Evaluation      Patient Name: Smith Henning    Today's Date: 3/26/2024     Problem List  Active Problems:    Atraumatic, closed fracture of thyroid cartilage of Unknown Etiology      Past Medical History  No past medical history on file.    Past Surgical History  No past surgical history on file.    Summary   Pt presented with functional appearing oral and pharyngeal stage swallowing skills with materials administered today. He is assessed with puree solids and thin liquids. The patient has good retrieval, transfer and clearance. He has good laryngeal rise upon palpation with no overt s/s aspiration. He reports pain with swallowing, but it is much improved since admission.    Patient and spouse only speak Russian.  #[id number] used during evaluation.    Risk/s for Aspiration: low      Recommended Diet: regular diet and thin liquids (patient encouraged to choose softer menu items for now)  Recommended Form of Meds: whole with liquid   Aspiration precautions and swallowing strategies: upright posture and small bites/sips  Other Recommendations: Continue frequent oral care; consider VBS if warranted    Current Medical Status  Patient is a 32 yo M with self reported history of GERD presenting with 3 days of worsening anterior throat pain, odynophagia, and dysphonia. He denies any trauma to the area, but notes a coughing episode about a month ago that may be related to this injury. He denies any history of bones fracturing easily. His reflux is controlled by diet and is currently not on medication.    CT soft tissue neck with contrast showed a patent airway with an acute to subacute right anterior paramedian nondisplaced fracture of the thyroid cartilage and old healed left anterior fracture.    ENT was consulted and performed a fiberoptic nasopharyngolaryngoscopy It was notable for decreased abduction of vocal cords bilaterally, mild edema of the R vocal fold and  false fold, mild arytenoid erythema, mild diffuse edema without vocal fold edema. Normal appearing nasal cavity, nasopharynx, fossa of Rosenmüller, oropharynx, BOT, epiglottis and negative for granulation, endolaryngeal mucus, and posterior commissure hypertrophy.    Current Precautions:  Fall  Aspiration    Allergies:  No known food allergies  Past medical history:  Please see H&P for details    Special Studies:  CT soft tissue 3/26/24:   Abnormal findings of the thyroid cartilage suspicious for acute to subacute right anterior paramedian nondisplaced fracture and old healed left anterior fracture.   Trace posttraumatic or inflammatory fluid immediately overlying the anterior thyroid cartilage extending inferiorly and posterolaterally on the right side with minimal thickening of the right vocal cord. Cervical airway is patent.    ENT laryngoscope results listed in H&P    Social/Education/Vocational Hx:  Pt lives with family    Swallow Information   Current Risks for Dysphagia & Aspiration:  thyroid cartilage fx  Current Symptoms/Concerns:  none  Current Diet: regular diet and thin liquids   Baseline Diet: regular diet and thin liquids    Baseline Assessment   Behavior/Cognition: alert  Speech/Language Status: able to participate in conversation and able to follow commands  Patient Positioning: upright in bed  Pain Status/Interventions/Response to Interventions: No report of or nonverbal indications of pain.     Swallow Mechanism Exam  Facial: symmetrical  Labial: WFL  Lingual: WFL  Velum: unable to visualize  Mandible: adequate ROM  Dentition: adequate  Vocal quality:breathy and weak   Volitional Cough: weak   Respiratory Status: on RA       Consistencies Assessed and Performance   Consistencies Administered: thin liquids and puree  Materials administered included ice cream and thin liquids    Oral Stage: WFL  Retrieval via tsp and straw is adequate. Bolus formation and transfer were functional with no significant  oral residue noted.  No overt s/s reduced oral control.    Pharyngeal Stage: WFL  Swallow Mechanics:  Swallowing initiation appeared prompt.  Laryngeal rise was palpated and judged to be within functional limits.  No coughing, throat clearing, change in vocal quality or respiratory status noted today.     Esophageal Concerns: none reported    Summary and Recommendations (see above)    Results Reviewed with: patient and RN     Treatment Recommended: dysphagia therapy      Frequency of treatment: 1-2 f/u sessions    Patient Stated Goal: none stated    Dysphagia LTG  -Patient will demonstrate safe and effective oral intake (without overt s/s significant oral/pharyngeal dysphagia including s/s penetration or aspiration) for the highest appropriate diet level.     Short Term Goals:  -Pt will tolerate Dysphagia 1/pureed diet and thin liquid with no significant s/s oral or pharyngeal dysphagia across 1-3 diagnostic session/s    -Patient will tolerate trials of upgraded food and/or liquid texture with no significant s/s of oral or pharyngeal dysphagia including aspiration across 1-3 diagnostic sessions     -Patient will comply with a Video/Modified Barium Swallow study for more complete assessment of swallowing anatomy/physiology/aspiration risk and to assess efficacy of treatment techniques so as to best guide treatment plan    Speech Therapy Prognosis   Prognosis: good    Prognosis Considerations: medical status

## 2024-03-27 VITALS
OXYGEN SATURATION: 97 % | RESPIRATION RATE: 18 BRPM | HEART RATE: 84 BPM | DIASTOLIC BLOOD PRESSURE: 68 MMHG | TEMPERATURE: 97.8 F | SYSTOLIC BLOOD PRESSURE: 111 MMHG

## 2024-03-27 LAB
ALBUMIN SERPL BCP-MCNC: 4.1 G/DL (ref 3.5–5)
ALP SERPL-CCNC: 60 U/L (ref 34–104)
ALT SERPL W P-5'-P-CCNC: 49 U/L (ref 7–52)
ANION GAP SERPL CALCULATED.3IONS-SCNC: 7 MMOL/L (ref 4–13)
AST SERPL W P-5'-P-CCNC: 24 U/L (ref 13–39)
BASOPHILS # BLD AUTO: 0.02 THOUSANDS/ÂΜL (ref 0–0.1)
BASOPHILS NFR BLD AUTO: 0 % (ref 0–1)
BILIRUB SERPL-MCNC: 0.94 MG/DL (ref 0.2–1)
BUN SERPL-MCNC: 19 MG/DL (ref 5–25)
CALCIUM SERPL-MCNC: 8.7 MG/DL (ref 8.4–10.2)
CHLORIDE SERPL-SCNC: 104 MMOL/L (ref 96–108)
CO2 SERPL-SCNC: 23 MMOL/L (ref 21–32)
CREAT SERPL-MCNC: 0.9 MG/DL (ref 0.6–1.3)
EOSINOPHIL # BLD AUTO: 0.01 THOUSAND/ÂΜL (ref 0–0.61)
EOSINOPHIL NFR BLD AUTO: 0 % (ref 0–6)
ERYTHROCYTE [DISTWIDTH] IN BLOOD BY AUTOMATED COUNT: 13 % (ref 11.6–15.1)
EST. AVERAGE GLUCOSE BLD GHB EST-MCNC: 137 MG/DL
GFR SERPL CREATININE-BSD FRML MDRD: 113 ML/MIN/1.73SQ M
GLUCOSE P FAST SERPL-MCNC: 133 MG/DL (ref 65–99)
GLUCOSE SERPL-MCNC: 133 MG/DL (ref 65–140)
HBA1C MFR BLD: 6.4 %
HCT VFR BLD AUTO: 45 % (ref 36.5–49.3)
HGB BLD-MCNC: 14.4 G/DL (ref 12–17)
IMM GRANULOCYTES # BLD AUTO: 0.05 THOUSAND/UL (ref 0–0.2)
IMM GRANULOCYTES NFR BLD AUTO: 0 % (ref 0–2)
LYMPHOCYTES # BLD AUTO: 1.24 THOUSANDS/ÂΜL (ref 0.6–4.47)
LYMPHOCYTES NFR BLD AUTO: 9 % (ref 14–44)
MCH RBC QN AUTO: 29.9 PG (ref 26.8–34.3)
MCHC RBC AUTO-ENTMCNC: 32 G/DL (ref 31.4–37.4)
MCV RBC AUTO: 94 FL (ref 82–98)
MONOCYTES # BLD AUTO: 0.52 THOUSAND/ÂΜL (ref 0.17–1.22)
MONOCYTES NFR BLD AUTO: 4 % (ref 4–12)
NEUTROPHILS # BLD AUTO: 12.69 THOUSANDS/ÂΜL (ref 1.85–7.62)
NEUTS SEG NFR BLD AUTO: 87 % (ref 43–75)
NRBC BLD AUTO-RTO: 0 /100 WBCS
PLATELET # BLD AUTO: 218 THOUSANDS/UL (ref 149–390)
PMV BLD AUTO: 10.1 FL (ref 8.9–12.7)
POTASSIUM SERPL-SCNC: 4.5 MMOL/L (ref 3.5–5.3)
PROT SERPL-MCNC: 6.6 G/DL (ref 6.4–8.4)
RBC # BLD AUTO: 4.81 MILLION/UL (ref 3.88–5.62)
SODIUM SERPL-SCNC: 134 MMOL/L (ref 135–147)
WBC # BLD AUTO: 14.53 THOUSAND/UL (ref 4.31–10.16)

## 2024-03-27 PROCEDURE — 85025 COMPLETE CBC W/AUTO DIFF WBC: CPT

## 2024-03-27 PROCEDURE — 99231 SBSQ HOSP IP/OBS SF/LOW 25: CPT | Performed by: OTOLARYNGOLOGY

## 2024-03-27 PROCEDURE — 83036 HEMOGLOBIN GLYCOSYLATED A1C: CPT

## 2024-03-27 PROCEDURE — 80053 COMPREHEN METABOLIC PANEL: CPT

## 2024-03-27 RX ORDER — PANTOPRAZOLE SODIUM 40 MG/1
40 TABLET, DELAYED RELEASE ORAL
Qty: 30 TABLET | Refills: 0 | Status: SHIPPED | OUTPATIENT
Start: 2024-03-28

## 2024-03-27 RX ORDER — METHYLPREDNISOLONE 4 MG/1
10 TABLET ORAL 4 TIMES DAILY
Qty: 70 TABLET | Refills: 0 | Status: SHIPPED | OUTPATIENT
Start: 2024-03-27 | End: 2024-04-03

## 2024-03-27 RX ORDER — FAMOTIDINE 40 MG/1
40 TABLET, FILM COATED ORAL EVERY MORNING
Qty: 30 TABLET | Refills: 0 | Status: SHIPPED | OUTPATIENT
Start: 2024-03-28

## 2024-03-27 RX ADMIN — DEXAMETHASONE SODIUM PHOSPHATE 10 MG: 10 INJECTION, SOLUTION INTRAMUSCULAR; INTRAVENOUS at 05:56

## 2024-03-27 RX ADMIN — PANTOPRAZOLE SODIUM 40 MG: 40 TABLET, DELAYED RELEASE ORAL at 05:56

## 2024-03-27 RX ADMIN — FAMOTIDINE 40 MG: 20 TABLET, FILM COATED ORAL at 08:47

## 2024-03-27 NOTE — UTILIZATION REVIEW
Initial Clinical Review    Admission: Date/Time/Statement:   Admission Orders (From admission, onward)       Ordered        03/26/24 1021  Place in Observation  Once                          Orders Placed This Encounter   Procedures    Place in Observation     Standing Status:   Standing     Number of Occurrences:   1     Order Specific Question:   Level of Care     Answer:   Med Surg [16]     ED Arrival Information       Expected   -    Arrival   3/26/2024 01:18    Acuity   Urgent              Means of arrival   Walk-In    Escorted by   Family Member    Service   [facility]    Admission type   Emergency              Arrival complaint   Sore Throat             Chief Complaint   Patient presents with    Neck Pain     Presents to ER with pain in the front of the neck, hurts upon palpation. Unable to eat/ drink, difficulty talking and swallowing. Voice hoarse sounding        Initial Presentation: 33 y.o. male to ED presents for     3/26  ENT cons; acute to subacute fracture of his thyroid cartilage on flexible fiberoptic laryngoscopy.   Diet today. NPO at [facility]. IVFs. Repeat bedside laryngoscopy tomorrow morning.Decadron 10 mg Q 6. Pepcid 40 mgs QHS.  Voice rest for 1-2 wks.       ED Triage Vitals   Temperature Pulse Respirations Blood Pressure SpO2   03/26/24 0130 03/26/24 0130 03/26/24 0130 03/26/24 0130 03/26/24 0130   98.4 °F (36.9 °C) 89 18 154/99 97 %      Temp Source Heart Rate Source Patient Position - Orthostatic VS BP Location FiO2 (%)   03/26/24 0130 03/26/24 0130 03/26/24 0130 03/26/24 0130 --   Tympanic Monitor Sitting Left arm       Pain Score       03/26/24 0300       10 - Worst Possible Pain          Wt Readings from Last 1 Encounters:   No data found for Wt     Additional Vital Signs:   03/27/24 07:14:50 97.8 °F (36.6 °C) 84 18 111/68 82 97 % -- --   03/26/24 23:18:50 97.4 °F (36.3 °C) Abnormal  84 -- 128/80 96 95 % None (Room air) Sitting   03/26/24 2049 -- -- -- -- -- 95 % None (Room air) --    03/26/24 14:10:46 98.6 °F (37 °C) 89 -- 143/92 109 96 % -- --   03/26/24 1033 -- 83 16 -- -- 97 % None (Room air) --   03/26/24 0600 -- 90 18 141/86 106 97 % None (Room air) --   03/26/24 0330 -- 75 18 138/86 -- 99 % None (Room air) Lying     Pertinent Labs/Diagnostic Test Results:   CT soft tissue neck with contrast   Final Result by Srinivasa Rodriguez MD (03/26 0531)      Abnormal findings of the thyroid cartilage suspicious for acute to subacute right anterior paramedian nondisplaced fracture and old healed left anterior fracture.      Trace posttraumatic or inflammatory fluid immediately overlying the anterior thyroid cartilage extending inferiorly and posterolaterally on the right side with minimal thickening of the right vocal cord. Cervical airway is patent.      No other evidence of acute process in the neck.      I personally discussed this study with BAILEY BOYKIN on 3/26/2024 at 05:26            Workstation performed: [id number]               Results from last 7 days   Lab Units 03/27/24  0437 03/26/24  0600   WBC Thousand/uL 14.53* 9.16   HEMOGLOBIN g/dL 14.4 15.7   HEMATOCRIT % 45.0 47.7   PLATELETS Thousands/uL 218 210   NEUTROS ABS Thousands/µL 12.69* 5.90         Results from last 7 days   Lab Units 03/27/24  0437 03/26/24  0600   SODIUM mmol/L 134* 139   POTASSIUM mmol/L 4.5 3.7   CHLORIDE mmol/L 104 104   CO2 mmol/L 23 26   ANION GAP mmol/L 7 9   BUN mg/dL 19 14   CREATININE mg/dL 0.90 0.94   EGFR ml/min/1.73sq m 113 107   CALCIUM mg/dL 8.7 9.0     Results from last 7 days   Lab Units 03/27/24  0437   AST U/L 24   ALT U/L 49   ALK PHOS U/L 60   TOTAL PROTEIN g/dL 6.6   ALBUMIN g/dL 4.1   TOTAL BILIRUBIN mg/dL 0.94         Results from last 7 days   Lab Units 03/27/24  0437 03/26/24  0600   GLUCOSE RANDOM mg/dL 133 84       Results from last 7 days   Lab Units 03/26/24  1406   TSH 3RD GENERATON uIU/mL 0.265*     Results from last 7 days   Lab Units 03/26/24  0600   PROCALCITONIN  ng/ml <0.05         ED Treatment:   Medication Administration from 03/26/2024 0118 to 03/26/2024 1348         Date/Time Order Dose Route Action     03/26/2024 0238 EDT dexamethasone oral liquid 15 mg 1.5 mL 15 mg Oral Given     03/26/2024 0300 EDT oxyCODONE (ROXICODONE) oral solution 5 mg 5 mg Oral Given     03/26/2024 0348 EDT oxyCODONE (ROXICODONE) oral solution 10 mg 10 mg Oral Given     03/26/2024 0411 EDT iohexol (OMNIPAQUE) 350 MG/ML injection (MULTI-DOSE) 85 mL 85 mL Intravenous Given     03/26/2024 0557 EDT morphine injection 4 mg 4 mg Intravenous Given     03/26/2024 1130 EDT dexamethasone (PF) (DECADRON) injection 10 mg 10 mg Intravenous Given     03/26/2024 1000 EDT pantoprazole (PROTONIX) EC tablet 40 mg 40 mg Oral Not Given     03/26/2024 1052 EDT famotidine (PEPCID) tablet 40 mg 40 mg Oral Not Given          No past medical history on file.  Present on Admission:  **None**      Admitting Diagnosis: Neck pain [M54.2]  Anterior neck pain [M54.2]  Fracture of laryngeal cartilage, initial encounter ([facility]) [S12.8XXA]  Dysphagia [R13.10]  Age/Sex: 33 y.o. male    Admission Orders:  Scheduled Medications:  dexamethasone, 10 mg, Intravenous, Q6H LISA  famotidine, 40 mg, Oral, QAM  pantoprazole, 40 mg, Oral, Early Morning      Continuous IV Infusions:  sodium chloride, 100 mL/hr, Intravenous, Continuous      PRN Meds:       None    Network Utilization Review Department  ATTENTION: Please call with any questions or concerns to [phone] and carefully listen to the prompts so that you are directed to the right person. All voicemails are confidential.   For Discharge needs, contact Care Management DC Support Team at [phone] opt. 2  Send all requests for admission clinical reviews, approved or denied determinations and any other requests to dedicated fax number below belonging to the campus where the patient is receiving treatment. List of dedicated fax numbers for the Facilities:  FACILITY NAME UR FAX NUMBER    ADMISSION DENIALS (Administrative/Medical Necessity) [phone]   DISCHARGE SUPPORT TEAM (NETWORK) [phone]   PARENT CHILD HEALTH (Maternity/NICU/Pediatrics) [phone]   [facility] [phone]   [facility] [phone]   [facility] [phone]   [facility] [phone]   [facility] [phone]   [facility] [phone]   [facility] [phone]   [facility] [phone]   [facility] [phone]   [facility] [phone]   [facility] [phone]   [facility] [phone]

## 2024-03-27 NOTE — DISCHARGE SUMMARY
INTERNAL MEDICINE RESIDENCY DISCHARGE SUMMARY     Smith Henning   33 y.o. male  MRN: [id number]  Room/Bed: [facility]-01     [facility]    Encounter: [id number]    *Atraumatic, closed fracture of the thyroid cartilage of unknown etiology    Patient here with 1 month of throat pain/vocal changes which occurred after an episode of harsh coughing.  Otherwise denies any shortness of breath, dysphagia, retching/vomitus, wheezing, or other red flags  CTH/N Soft Tissue wwo Con (3/26):  Suspicious for acute to subacute right anterior paramedian nondisplaced fracture and old healed left anterior fracture of Thyroid Cartillage  Trace posttraumatic or inflammatory fluid immediately overlying the anterior thyroid cartilage extending inferiorly and posterolaterally on the right side with minimal thickening of the right vocal cord. Cervical airway is patent.    Patient was seen and had fiberoptic nasopharyngeal laryngoscopy x 2, improvement in vocal cord motion and edema was noted from 3/26 to 3/27.  After this, ENT felt comfortable discharging    Plan:  Per ENT, Recommend medrol dose pack on discharge.  Recommend Pepcid 40 mgs QHS to be continued as outpatient.  Recommend Omeprazole 40 mgs QAM to be continued as outpatient.  Voice rest for 1 - 2 weeks.  Follow up with ENT in 2 weeks as outpatient.      Physical Exam  Vitals and nursing note reviewed.   Constitutional:       General: He is not in acute distress.     Appearance: Normal appearance. He is not ill-appearing, toxic-appearing or diaphoretic.   HENT:      Mouth/Throat:      Palate: No mass and lesions.      Tonsils: No tonsillar exudate or tonsillar abscesses.   Cardiovascular:      Rate and Rhythm: Normal rate and regular rhythm.      Heart sounds: No murmur heard.     No friction rub. No gallop.   Pulmonary:      Effort: Pulmonary effort is normal. No respiratory distress.      Breath sounds: Normal breath  sounds. No stridor. No wheezing, rhonchi or rales.   Chest:      Chest wall: No tenderness.   Abdominal:      General: Abdomen is flat. Bowel sounds are normal. There is no distension.      Palpations: Abdomen is soft. There is no mass.      Tenderness: There is no abdominal tenderness. There is no guarding or rebound.      Hernia: No hernia is present.   Neurological:      Mental Status: He is alert.          DETAILS OF HOSPITAL COURSE     33-year-old male with no significant past medical history presents with 1 month history of throat pain/voice changes which occurred after several week history of harsh coughing.  Patient was also endorsing 10/10 right-sided neck pain near the thyroid cartilage, that was exquisitely tender to palpation.    CTH/N Soft Tissue wwo Con (3/26):    Suspicious for acute to subacute right anterior paramedian nondisplaced fracture and old healed left anterior fracture of Thyroid Cartillage    Trace posttraumatic or inflammatory fluid immediately overlying the anterior thyroid cartilage extending inferiorly and posterolaterally on the right side with minimal thickening of the right vocal cord. Cervical airway is patent.    ENT was consulted and they performed fiberoptic nasopharyngeal laryngoscopy, and whole patient had mild mobility history of vocal cords and edema surrounding vocal cords. ENT gave decadron and observed overnight. After performing nasopharyngeal laryngoscopy in the morning of 3/27, there is significant improvement compared to imaging from yesterday.  ENT felt comfortable discharging patient on p.o. steroids and follow-up with them in 2 weeks.        DISCHARGE INFORMATION     PCP at Discharge: [facility]    Admitting Provider: Madisyn Larkin MD  Admission Date: 3/26/2024    Discharge Provider: No att. providers found  Discharge Date: 03/27/24      Discharge Disposition: Home/Self Care  Discharge Condition: good  Discharge with Lines: no    Discharge Diet: regular  diet  Activity Restrictions: none  Test Results Pending at Discharge:     Discharge Diagnoses:  Principal Problem:    Atraumatic, closed fracture of thyroid cartilage of Unknown Etiology  Resolved Problems:    * No resolved hospital problems. *      Consulting Providers:      Diagnostic & Therapeutic Procedures Performed:  CT soft tissue neck with contrast    Result Date: 3/26/2024  Impression: Abnormal findings of the thyroid cartilage suspicious for acute to subacute right anterior paramedian nondisplaced fracture and old healed left anterior fracture. Trace posttraumatic or inflammatory fluid immediately overlying the anterior thyroid cartilage extending inferiorly and posterolaterally on the right side with minimal thickening of the right vocal cord. Cervical airway is patent. No other evidence of acute process in the neck. I personally discussed this study with BAILEY BOYKIN on 3/26/2024 at 05:26 Workstation performed: [id number]       Code Status: Level 1 - Full Code  Advance Directive & Living Will: <no information>  Power of :    POLST:      Medications:  Discharge Medication List as of 3/27/2024 10:39 AM        Discharge Medication List as of 3/27/2024 10:39 AM        START taking these medications    Details   famotidine (PEPCID) 40 MG tablet Take 1 tablet (40 mg total) by mouth every morning, Starting Thu 3/28/2024, Normal      methylPREDNISolone 4 MG tablet therapy pack Take 2.5 tablets (10 mg total) by mouth 4 (four) times a day for 7 days Use as directed on package, Starting Wed 3/27/2024, Until Wed 4/3/2024, Normal      pantoprazole (PROTONIX) 40 mg tablet Take 1 tablet (40 mg total) by mouth daily in the early morning, Starting Thu 3/28/2024, Normal           Discharge Medication List as of 3/27/2024 10:39 AM          Allergies:  No Known Allergies    FOLLOW-UP     PCP Outpatient Follow-up:  [facility]    Consulting Providers Follow-up:  Follow-up with ENT within 2 weeks    Active  "Issues Requiring Follow-up:       Discharge Statement:   I spent 30 minutes minutes discharging the patient. This time was spent on the day of discharge. I had direct contact with the patient on the day of discharge. Additional documentation is required if more than 30 minutes were spent on discharge.    Portions of the record may have been created with voice recognition software.  Occasional wrong word or \"sound a like\" substitutions may have occurred due to the inherent limitations of voice recognition software.  Read the chart carefully and recognize, using context, where substitutions have occurred.    ==  Albin Weiss MD  [facility]  Internal Medicine Resident PGY-1   "

## 2024-03-27 NOTE — PROGRESS NOTES
Otolaryngology HN/FPRS Progress Note:    Subjective: Pt with laryngeal fracture, suspected to be subacute. He reports he is doing well today. He believes his voice has improved. No dysphagia. Minor odynophagia. No dyspnea.    Objective:   /80 (BP Location: Right arm)   Pulse 84   Temp (!) 97.4 °F (36.3 °C) (Oral)   Resp 16   SpO2 95%     Physical Exam   Gen: NAD, A/O x 3.  Neuro: CN 2-12 intact except as below  Lungs: Breathing easily. No stertor or stridor  CV: RRR. Good distal perfusion  Neck: Soft and flat  Abd: Soft NTND    Procedure: Fiberoptic nasopharyngolaryngoscopy  Diagnosis: laryngeal fracture    The risks, benefits and alternatives were discussed. The patient voiced their understanding. They wished to proceed and an informed consent was obtained. The patient's nose was topically decongested and anesthetized using Lidocaine and oxymetazoline. To obtain a view not available with a laryngeal mirror, a fiberoptic endoscope was inserted via the right nasal cavity.      Findings listed below:    -Normal appearing nasal cavity, nasopharynx, fossa of Rosenmüller, oropharynx, BOT, epiglottis.  - Vocal folds mobile bilaterally  - No Subglottic edema  - Mild arytenoid erythema  - Mild diffuse edema without vocal fold edema  - No posterior commissure hypertrophy  - No granulation  - No thick endolaryngeal mucus      Assessment/Plan: 33 y.o. M with likely subacute laryngeal fracture; clinically improved overnight.  Recommend medrol dose pack on discharge.  Recommend Pepcid 40 mgs QHS to be continued as outpatient.  Recommend Omeprazole 40 mgs QAM to be continued as outpatient.  Voice rest for 1 - 2 weeks.  Follow up with ENT in 2 weeks as outpatient.    Dispo: Adequate for discharge from ENT perspective.

## 2024-03-28 ENCOUNTER — TELEPHONE (OUTPATIENT)
Dept: INTERNAL MEDICINE CLINIC | Facility: CLINIC | Age: 34
End: 2024-03-28

## 2024-03-28 NOTE — TELEPHONE ENCOUNTER
Please verify insurance and offer appt if appropriate. Patient lives in [place], may prefer [place]

## 2024-03-28 NOTE — TELEPHONE ENCOUNTER
----- Message from Albin Gomes MD sent at 3/27/2024  1:58 PM EDT -----  Regarding: TCM appointment  Hi Marleni,     Can you please schedule a TCM appointment for Smith?    Thanks as always for your help, appreciate you,  Lawrence

## 2024-04-04 ENCOUNTER — TELEPHONE (OUTPATIENT)
Age: 34
End: 2024-04-04

## 2024-04-04 ENCOUNTER — OFFICE VISIT (OUTPATIENT)
Dept: INTERNAL MEDICINE CLINIC | Facility: CLINIC | Age: 34
End: 2024-04-04

## 2024-04-04 VITALS
SYSTOLIC BLOOD PRESSURE: 127 MMHG | WEIGHT: 207 LBS | TEMPERATURE: 98 F | HEART RATE: 88 BPM | HEIGHT: 74 IN | DIASTOLIC BLOOD PRESSURE: 84 MMHG | BODY MASS INDEX: 26.56 KG/M2

## 2024-04-04 DIAGNOSIS — Z11.4 ENCOUNTER FOR SCREENING FOR HIV: ICD-10-CM

## 2024-04-04 DIAGNOSIS — Z13.220 SCREENING FOR HYPERLIPIDEMIA: ICD-10-CM

## 2024-04-04 DIAGNOSIS — R73.03 PREDIABETES: ICD-10-CM

## 2024-04-04 DIAGNOSIS — R79.89 ABNORMAL TSH: ICD-10-CM

## 2024-04-04 DIAGNOSIS — H61.21 IMPACTED CERUMEN OF RIGHT EAR: ICD-10-CM

## 2024-04-04 DIAGNOSIS — S12.8XXA CLOSED FRACTURE OF THYROID CARTILAGE, INITIAL ENCOUNTER (HCC): Primary | ICD-10-CM

## 2024-04-04 PROCEDURE — 69210 REMOVE IMPACTED EAR WAX UNI: CPT | Performed by: INTERNAL MEDICINE

## 2024-04-04 PROCEDURE — 99495 TRANSJ CARE MGMT MOD F2F 14D: CPT | Performed by: INTERNAL MEDICINE

## 2024-04-04 PROCEDURE — 99205 OFFICE O/P NEW HI 60 MIN: CPT | Performed by: INTERNAL MEDICINE

## 2024-04-04 RX ORDER — NAPROXEN 500 MG/1
500 TABLET ORAL 2 TIMES DAILY WITH MEALS
Qty: 30 TABLET | Refills: 0 | Status: SHIPPED | OUTPATIENT
Start: 2024-04-04 | End: 2024-04-19

## 2024-04-04 NOTE — PROGRESS NOTES
Assessment & Plan     1. Closed fracture of thyroid cartilage, initial encounter (HCC)  Patients presents to the clinic after being discharged from the hospital due to nontraumatic thyroid cartilage fracture.   Patient reported that he held his sneezing 1 year ago which caused him throat pain but it improved by itself and it happened again this months which caused him a lot of pain.  Patient had CT scan soft tissue in the hospital which showed right anterior paramedian nondisplaced fracture and old healed left anterior fracture of thyroid cartilage.    Patient was seen by ENT in the hospital which recommended Medrol pack, Pepcid 40 mg nightly and omeprazole 40 mg in the morning and follow-up with ENT in 1 to 2 weeks in the clinic.    Patient also had laryngoscope which showed improvement in the cord motion and edema.    Patient reported improvement of his symptoms however he still complaining that he loses his voice very easily after talking.     Patient denied any history of trauma to his thyroid cartilage.  -     Ambulatory Referral to Otolaryngology; Future  -     naproxen (Naprosyn) 500 mg tablet; Take 1 tablet (500 mg total) by mouth 2 (two) times a day with meals for 15 days    2. Encounter for screening for HIV  -     HIV 1/2 AG/AB w Reflex [facility] for 2 yr old and above; Future    3. Abnormal TSH  During hospitalization patient was tested for TSH  TSH was 0.2  Repeat TSH and reflex to T4  -     TSH, 3rd generation with Free T4 reflex; Future    4. Screening for hyperlipidemia  -     Lipid panel; Future    5- impacted right earwax  Patient has impacted right earwax which was cleaned up by ear wash and irrigation    6- prediabetes  Patient A1c is 6.4  Patient was advised about importance of healthy lifestyle, decrease sugar intake and exercise  Will check A1c next year.     Subjective     HPI  34-year-old male patient with no significant past medical history who presents to the clinic after being discharged from  the hospital due to nontraumatic thyroid cartilage fracture.  Patient reported that he held his sneezing 1 year ago which caused him throat pain but it improved by itself and it happened again this months which caused him a lot of pain.  Patient had CT scan soft tissue in the hospital which showed right anterior paramedian nondisplaced fracture and old healed left anterior fracture of thyroid cartilage.  Patient was seen by ENT in the hospital which recommended Pepcid 40 mg nightly and omeprazole 40 mg in the morning and follow-up with ENT in 1 to 2 weeks in the clinic.  Patient also had laryngoscope which showed improvement in the cord motion and edema.  Patient reported improvement of his symptoms however he still complaining that he loses his voice very easily after talking.  Patient also was advised that his A1c is 6.4 and he is prediabetic.  Patient denied any history of trauma to his thyroid cartilage.    Review of Systems   Constitutional:  Negative for activity change, appetite change, chills, diaphoresis, fatigue, fever and unexpected weight change.   HENT:  Positive for sore throat and voice change. Negative for congestion, dental problem, drooling, ear discharge, ear pain, facial swelling, hearing loss, mouth sores, nosebleeds, rhinorrhea, sinus pain, sneezing, tinnitus and trouble swallowing.    Eyes:  Negative for pain, redness, itching and visual disturbance.   Respiratory:  Negative for apnea, cough, choking, chest tightness, shortness of breath, wheezing and stridor.    Cardiovascular:  Negative for chest pain, palpitations and leg swelling.   Gastrointestinal:  Negative for abdominal distention, abdominal pain, blood in stool, constipation, diarrhea and nausea.   Endocrine: Negative for cold intolerance, heat intolerance, polydipsia, polyphagia and polyuria.   Genitourinary:  Negative for difficulty urinating, dysuria, enuresis, flank pain, frequency, genital sores, hematuria, penile discharge and  "penile swelling.   Musculoskeletal:  Negative for arthralgias, back pain, gait problem, joint swelling, myalgias, neck pain and neck stiffness.   Skin:  Negative for color change, pallor and rash.   Allergic/Immunologic: Negative for environmental allergies, food allergies and immunocompromised state.   Neurological:  Negative for seizures, facial asymmetry, speech difficulty, weakness, light-headedness, numbness and headaches.   Psychiatric/Behavioral:  Negative for agitation, behavioral problems, confusion, decreased concentration, dysphoric mood, hallucinations, sleep disturbance and suicidal ideas. The patient is not nervous/anxious.        Objective     /84 (BP Location: Right arm, Patient Position: Sitting, Cuff Size: Large)   Pulse 88   Temp 98 °F (36.7 °C) (Temporal)   Ht 6' 2\" (1.88 m)   Wt 93.9 kg (207 lb)   BMI 26.58 kg/m²      Physical Exam  Constitutional:       General: He is not in acute distress.     Appearance: He is not ill-appearing, toxic-appearing or diaphoretic.   HENT:      Head: Normocephalic and atraumatic.      Right Ear: There is impacted cerumen.      Left Ear: Tympanic membrane, ear canal and external ear normal. There is no impacted cerumen.      Nose: Nose normal. No congestion or rhinorrhea.      Mouth/Throat:      Mouth: Mucous membranes are moist.      Pharynx: No oropharyngeal exudate or posterior oropharyngeal erythema.   Eyes:      General: No scleral icterus.        Right eye: No discharge.         Left eye: No discharge.      Pupils: Pupils are equal, round, and reactive to light.   Neck:      Vascular: No carotid bruit.   Cardiovascular:      Rate and Rhythm: Normal rate and regular rhythm.      Pulses: Normal pulses.      Heart sounds: Normal heart sounds. No murmur heard.     No friction rub. No gallop.   Pulmonary:      Effort: Pulmonary effort is normal. No respiratory distress.      Breath sounds: Normal breath sounds. No stridor. No wheezing, rhonchi or rales. "   Chest:      Chest wall: No tenderness.   Abdominal:      General: Abdomen is flat. There is no distension.      Palpations: There is no mass.      Tenderness: There is no abdominal tenderness. There is no guarding.      Hernia: No hernia is present.   Musculoskeletal:         General: No swelling, tenderness, deformity or signs of injury. Normal range of motion.      Cervical back: Normal range of motion. No rigidity or tenderness.      Right lower leg: No edema.      Left lower leg: No edema.   Lymphadenopathy:      Cervical: No cervical adenopathy.   Skin:     General: Skin is warm.      Coloration: Skin is not jaundiced or pale.      Findings: No bruising, erythema, lesion or rash.   Neurological:      General: No focal deficit present.      Mental Status: He is alert and oriented to person, place, and time.      Cranial Nerves: No cranial nerve deficit.      Sensory: No sensory deficit.      Motor: No weakness.   Psychiatric:         Mood and Affect: Mood normal.         Behavior: Behavior normal.         Thought Content: Thought content normal.         Judgment: Judgment normal.          Ear cerumen removal    Date/Time: 4/4/2024 1:30 PM    Performed by: Shahab Garcia DO  Authorized by: Shahab Garcia DO  Universal Protocol:  Consent: Verbal consent obtained. Written consent not obtained.  Consent given by: patient  Timeout called at: 4/4/2024 2:13 PM.  Patient understanding: patient states understanding of the procedure being performed  Patient consent: the patient's understanding of the procedure matches consent given  Procedure consent: procedure consent matches procedure scheduled  Relevant documents: relevant documents not present or verified  Test results: test results not available  Site marked: the operative site was marked  Radiology Images displayed and confirmed. If images not available, report reviewed: imaging studies not available  Patient identity confirmed: verbally with patient    Patient  location:  Clinic  Procedure details:     Local anesthetic:  None    Location:  R ear    Procedure type: irrigation with instrumentation      Instrumentation: curette      Approach:  External  Post-procedure details:     Complication:  None    Hearing quality:  Normal    Patient tolerance of procedure:  Tolerated well, no immediate complications     Medications have been reviewed by provider in current encounter    Shahab Garcia DO

## 2024-04-04 NOTE — TELEPHONE ENCOUNTER
Patient was evaluated by Dr. Sergio Evans in the ER. Spoke to the patient using the Language Line  and phone number was provided for [facility] location.

## 2024-07-29 ENCOUNTER — TELEPHONE (OUTPATIENT)
Dept: INTERNAL MEDICINE CLINIC | Facility: CLINIC | Age: 34
End: 2024-07-29

## 2024-09-12 ENCOUNTER — CONSULT (OUTPATIENT)
Dept: GASTROENTEROLOGY | Facility: CLINIC | Age: 34
End: 2024-09-12
Payer: COMMERCIAL

## 2024-09-12 VITALS — BODY MASS INDEX: 25.67 KG/M2 | WEIGHT: 200 LBS | TEMPERATURE: 98.6 F | HEIGHT: 74 IN

## 2024-09-12 DIAGNOSIS — R10.13 DYSPEPSIA: ICD-10-CM

## 2024-09-12 DIAGNOSIS — K21.9 LARYNGOPHARYNGEAL REFLUX (LPR): ICD-10-CM

## 2024-09-12 DIAGNOSIS — Z11.59 NEED FOR HEPATITIS C SCREENING TEST: ICD-10-CM

## 2024-09-12 DIAGNOSIS — K21.9 GASTROESOPHAGEAL REFLUX DISEASE, UNSPECIFIED WHETHER ESOPHAGITIS PRESENT: Primary | ICD-10-CM

## 2024-09-12 DIAGNOSIS — Z11.59 NEED FOR HEPATITIS B SCREENING TEST: ICD-10-CM

## 2024-09-12 PROCEDURE — 99204 OFFICE O/P NEW MOD 45 MIN: CPT | Performed by: INTERNAL MEDICINE

## 2024-09-12 RX ORDER — OMEPRAZOLE 40 MG/1
40 CAPSULE, DELAYED RELEASE ORAL DAILY
Qty: 90 CAPSULE | Refills: 3 | Status: SHIPPED | OUTPATIENT
Start: 2024-09-12 | End: 2025-09-07

## 2024-09-12 NOTE — PROGRESS NOTES
[facility] - Outpatient Consultation  Smith Henning 34 y.o. male MRN: [id number]  Encounter: [id number]          ASSESSMENT AND PLAN:      1.  Gastroesophageal reflux  Patient presented to the clinic as a referral from ENT due to concern for gastroesophageal reflux disease.    Patient reported that in 2021 he had reflux and gastritis in [place] and he had EGD which showed gastritis with negative H. pylori.    In March 2024 patient started to have anterior neck pain, dysphagia, odynophagia and dysphonia in which he was admitted to the hospital.    Patient had CT scanning which showed suspecting acute to subacute fracture of thyroid cartilage and old healed left anterior fracture with thickening of the vocal cords.    ENT during hospitalization did fiberoptic laryngoscopy which showed edema surrounding the vocal cord.    Patient follow-up outpatient with ENT who did another flexible laryngoscopy but did not show any evidence of fracture and suspected laryngopharyngeal reflux.    Patient was started by ENT on omeprazole and reported improvement.  Patient still complaining of some pain in the chest referring to his throat.    Patient started lifestyle modification which did help in improving her symptoms.  Patient denies smoking, alcohol.  No tenderness on exam the abdomen  Plan  Will continue omeprazole for 3 months  Will see patient after 3 months and might consider EGD if there is no improvement on PPI  omeprazole (PriLOSEC) 40 MG capsule; Take 1 capsule (40 mg total) by mouth daily 30-60 minutes before dinner  Dispense: 90 capsule; Refill: 3      3. Need for hepatitis C screening test  - Hepatitis C antibody; Future    4. Need for hepatitis B screening test  - Hepatitis B surface antibody; Future  - Hepatitis B surface antigen; Future  - Hepatitis B core antibody, total; Future    ______________________________________________________________________    HPI: 33-year-old  male patient with past medical history of prediabetes who presented to the clinic as a referral from ENT due to concern for gastroesophageal reflux disease.  Patient reported that in 2021 he had reflux and gastritis in [place] and he had EGD which showed gastritis with negative H. pylori.  In March 2024 patient started to have anterior neck pain, dysphagia, odynophagia and dysphonia in which he was admitted to the hospital.  Patient did CT scanning which showed suspecting acute to subacute fracture of thyroid cartilage and old healed left anterior fracture with thickening of the vocal cords.  ENT during hospitalization did fiberoptic laryngoscopy which showed edema surrounding the vocal cord.  Patient follow-up outpatient with ENT who did another flexible laryngoscopy but did not show any evidence of fracture and suspected laryngopharyngeal reflux.  Patient was started by ENT on omeprazole and reported improvement.  Patient still complaining of some pain in the chest referring to his throat.  Patient started lifestyle modification which did help in improving her symptoms.  Patient denies smoking, alcohol.      REVIEW OF SYSTEMS:    CONSTITUTIONAL: Denies any fever, chills, rigors, and weight loss.  HEENT: No earache or tinnitus. Denies hearing loss or visual disturbances.  CARDIOVASCULAR: No chest pain or palpitations.   RESPIRATORY: Denies any cough, hemoptysis, shortness of breath or dyspnea on exertion.  GASTROINTESTINAL: As noted in the History of Present Illness.   GENITOURINARY: No problems with urination. Denies any hematuria or dysuria.  NEUROLOGIC: No dizziness or vertigo, denies headaches.   MUSCULOSKELETAL: Denies any muscle or joint pain.   SKIN: Denies skin rashes or itching.   ENDOCRINE: Denies excessive thirst. Denies intolerance to heat or cold.  PSYCHOSOCIAL: Denies depression or anxiety. Denies any recent memory loss.       Historical Information   Past Medical History:   Diagnosis Date  "   Gastritis      History reviewed. No pertinent surgical history.  Social History   Social History     Substance and Sexual Activity   Alcohol Use Never     Social History     Substance and Sexual Activity   Drug Use Never     Social History     Tobacco Use   Smoking Status Never   Smokeless Tobacco Never     History reviewed. No pertinent family history.    Meds/Allergies       Current Outpatient Medications:     omeprazole (PriLOSEC) 40 MG capsule    naproxen (Naprosyn) 500 mg tablet    No Known Allergies        Objective     Temperature 98.6 °F (37 °C), temperature source Tympanic, height 6' 2\" (1.88 m), weight 90.7 kg (200 lb). Body mass index is 25.68 kg/m².        PHYSICAL EXAM:      General Appearance:   Alert, cooperative, no distress   HEENT:   Normocephalic, atraumatic, anicteric.     Neck:  Supple, symmetrical, trachea midline   Lungs:   Clear to auscultation bilaterally; no rales, rhonchi or wheezing; respirations unlabored    Heart::   Regular rate and rhythm; no murmur, rub, or gallop.   Abdomen:   Soft, non-tender, non-distended; normal bowel sounds; no masses, no organomegaly    Genitalia:   Deferred    Rectal:   Deferred    Extremities:  No cyanosis, clubbing or edema    Pulses:  2+ and symmetric    Skin:  No jaundice, rashes, or lesions    Lymph nodes:  No palpable cervical lymphadenopathy        Lab Results:   No visits with results within 1 Day(s) from this visit.   Latest known visit with results is:   Admission on 03/26/2024, Discharged on 03/27/2024   Component Date Value    WBC 03/26/2024 9.16     RBC 03/26/2024 5.12     Hemoglobin 03/26/2024 15.7     Hematocrit 03/26/2024 47.7     MCV 03/26/2024 93     MCH 03/26/2024 30.7     MCHC 03/26/2024 32.9     RDW 03/26/2024 12.9     MPV 03/26/2024 10.7     Platelets 03/26/2024 210     nRBC 03/26/2024 0     Segmented % 03/26/2024 65     Immature Grans % 03/26/2024 0     Lymphocytes % 03/26/2024 25     Monocytes % 03/26/2024 8     Eosinophils Relative " 03/26/2024 2     Basophils Relative 03/26/2024 0     Absolute Neutrophils 03/26/2024 5.90     Absolute Immature Grans 03/26/2024 0.02     Absolute Lymphocytes 03/26/2024 2.27     Absolute Monocytes 03/26/2024 0.75     Eosinophils Absolute 03/26/2024 0.19     Basophils Absolute 03/26/2024 0.03     Sodium 03/26/2024 139     Potassium 03/26/2024 3.7     Chloride 03/26/2024 104     CO2 03/26/2024 26     ANION GAP 03/26/2024 9     BUN 03/26/2024 14     Creatinine 03/26/2024 0.94     Glucose 03/26/2024 84     Calcium 03/26/2024 9.0     eGFR 03/26/2024 107     Procalcitonin 03/26/2024 <0.05     TSH 3RD GENERATON 03/26/2024 0.265 (L)     Sodium 03/27/2024 134 (L)     Potassium 03/27/2024 4.5     Chloride 03/27/2024 104     CO2 03/27/2024 23     ANION GAP 03/27/2024 7     BUN 03/27/2024 19     Creatinine 03/27/2024 0.90     Glucose 03/27/2024 133     Glucose, Fasting 03/27/2024 133 (H)     Calcium 03/27/2024 8.7     AST 03/27/2024 24     ALT 03/27/2024 49     Alkaline Phosphatase 03/27/2024 60     Total Protein 03/27/2024 6.6     Albumin 03/27/2024 4.1     Total Bilirubin 03/27/2024 0.94     eGFR 03/27/2024 113     WBC 03/27/2024 14.53 (H)     RBC 03/27/2024 4.81     Hemoglobin 03/27/2024 14.4     Hematocrit 03/27/2024 45.0     MCV 03/27/2024 94     MCH 03/27/2024 29.9     MCHC 03/27/2024 32.0     RDW 03/27/2024 13.0     MPV 03/27/2024 10.1     Platelets 03/27/2024 218     nRBC 03/27/2024 0     Segmented % 03/27/2024 87 (H)     Immature Grans % 03/27/2024 0     Lymphocytes % 03/27/2024 9 (L)     Monocytes % 03/27/2024 4     Eosinophils Relative 03/27/2024 0     Basophils Relative 03/27/2024 0     Absolute Neutrophils 03/27/2024 12.69 (H)     Absolute Immature Grans 03/27/2024 0.05     Absolute Lymphocytes 03/27/2024 1.24     Absolute Monocytes 03/27/2024 0.52     Eosinophils Absolute 03/27/2024 0.01     Basophils Absolute 03/27/2024 0.02     Hemoglobin A1C 03/27/2024 6.4 (H)     EAG 03/27/2024 137          Radiology Results:    No results found.

## 2024-09-12 NOTE — PATIENT INSTRUCTIONS
Patient was given labs to have done and also   Follow up will be 12/18/2024 with Dr. Us at 9:30 am

## 2025-08-06 ENCOUNTER — TELEPHONE (OUTPATIENT)
Dept: INTERNAL MEDICINE CLINIC | Facility: CLINIC | Age: 35
End: 2025-08-06